# Patient Record
Sex: FEMALE | Race: ASIAN | NOT HISPANIC OR LATINO | Employment: FULL TIME | URBAN - METROPOLITAN AREA
[De-identification: names, ages, dates, MRNs, and addresses within clinical notes are randomized per-mention and may not be internally consistent; named-entity substitution may affect disease eponyms.]

---

## 2020-01-03 ENCOUNTER — HOSPITAL ENCOUNTER (EMERGENCY)
Facility: OTHER | Age: 25
Discharge: HOME | End: 2020-01-03
Attending: EMERGENCY MEDICINE
Payer: MEDICAID

## 2020-01-03 VITALS
SYSTOLIC BLOOD PRESSURE: 105 MMHG | HEIGHT: 67 IN | OXYGEN SATURATION: 99 % | WEIGHT: 165 LBS | RESPIRATION RATE: 18 BRPM | HEART RATE: 53 BPM | TEMPERATURE: 98 F | DIASTOLIC BLOOD PRESSURE: 65 MMHG | BODY MASS INDEX: 25.9 KG/M2

## 2020-01-03 DIAGNOSIS — S69.92XA INJURY OF FINGER OF LEFT HAND, INITIAL ENCOUNTER: Primary | ICD-10-CM

## 2020-01-03 LAB
B-HCG UR QL: NEGATIVE
CTP QC/QA: YES

## 2020-01-03 PROCEDURE — 81025 URINE PREGNANCY TEST: CPT | Performed by: EMERGENCY MEDICINE

## 2020-01-03 PROCEDURE — 99283 EMERGENCY DEPT VISIT LOW MDM: CPT | Mod: 25

## 2020-01-03 PROCEDURE — 29130 APPL FINGER SPLINT STATIC: CPT | Mod: F4

## 2020-01-03 PROCEDURE — 25000003 PHARM REV CODE 250: Performed by: PHYSICIAN ASSISTANT

## 2020-01-03 RX ORDER — AMOXICILLIN AND CLAVULANATE POTASSIUM 875; 125 MG/1; MG/1
1 TABLET, FILM COATED ORAL 2 TIMES DAILY
Qty: 14 TABLET | Refills: 0 | Status: SHIPPED | OUTPATIENT
Start: 2020-01-03

## 2020-01-03 RX ORDER — AMOXICILLIN AND CLAVULANATE POTASSIUM 875; 125 MG/1; MG/1
1 TABLET, FILM COATED ORAL
Status: COMPLETED | OUTPATIENT
Start: 2020-01-03 | End: 2020-01-03

## 2020-01-03 RX ADMIN — AMOXICILLIN AND CLAVULANATE POTASSIUM 1 TABLET: 875; 125 TABLET, FILM COATED ORAL at 03:01

## 2020-01-03 NOTE — PLAN OF CARE
"Met with patient at bedside to discuss the need for a follow up appt - patient will schedule an appt at [facility] to establish primary care & follow up with hand specialist if needed - patient denied the need for contact info to clinic stating "i'm aware of them - I used to live right there - i'll give them a call but have to get to work now" - Contact info provided to CM in case of need   "
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
